# Patient Record
Sex: FEMALE | URBAN - METROPOLITAN AREA
[De-identification: names, ages, dates, MRNs, and addresses within clinical notes are randomized per-mention and may not be internally consistent; named-entity substitution may affect disease eponyms.]

---

## 2018-04-04 ENCOUNTER — APPOINTMENT (RX ONLY)
Dept: URBAN - METROPOLITAN AREA CLINIC 116 | Facility: CLINIC | Age: 68
Setting detail: DERMATOLOGY
End: 2018-04-04

## 2018-04-04 DIAGNOSIS — L81.4 OTHER MELANIN HYPERPIGMENTATION: ICD-10-CM

## 2018-04-04 DIAGNOSIS — Z71.89 OTHER SPECIFIED COUNSELING: ICD-10-CM

## 2018-04-04 DIAGNOSIS — L81.9 DISORDER OF PIGMENTATION, UNSPECIFIED: ICD-10-CM

## 2018-04-04 PROCEDURE — 99202 OFFICE O/P NEW SF 15 MIN: CPT

## 2018-04-04 PROCEDURE — ? TREATMENT REGIMEN

## 2018-04-04 PROCEDURE — ? COUNSELING

## 2018-04-04 ASSESSMENT — LOCATION DETAILED DESCRIPTION DERM
LOCATION DETAILED: RIGHT MEDIAL FOREHEAD
LOCATION DETAILED: LEFT MID PREAURICULAR CHEEK
LOCATION DETAILED: RIGHT CENTRAL MALAR CHEEK

## 2018-04-04 ASSESSMENT — LOCATION ZONE DERM: LOCATION ZONE: FACE

## 2018-04-04 ASSESSMENT — LOCATION SIMPLE DESCRIPTION DERM
LOCATION SIMPLE: LEFT CHEEK
LOCATION SIMPLE: RIGHT CHEEK
LOCATION SIMPLE: RIGHT FOREHEAD

## 2018-04-04 NOTE — PROCEDURE: TREATMENT REGIMEN
Initiate Treatment: Free and clear liquid cleanser daily, hat, sunscreen
Plan: Discussed in detail 45 mins) possible causes/differential diagnosis considerations of hyperpigmentation/gray skin tone on face i.e. methemoglobulinemia, lead, silver, copper exposure/ingestion, irritation from skin care products and hues (?[employer] product) , dyschromia from sun exposure, medications/photosensitivity reaction with BP medications, soy, hydroquinone, post inflammatory hyperpigmentation, COPD, heamchromatosis, other. Recommended 110 SPF or sun block. Elta MD block \\nAdvised to get bloodwork done with PCP: estrogen,  copper, lead, FSH/LH, CBC with diff,CMP, Iron,anti DS DNA, DILLON, homogentisic acid, see cardiologist to check oxygen levels. \\nBiopsy for diagnosis was offered and pt declined at this time. \\nRecommended dermablend makeup for coverage and total sun block.
Samples Given: Neutrogena 60 spf sunscreen pure baby
Detail Level: Zone
Discontinue Regimen: Current facial products

## 2018-04-04 NOTE — PROCEDURE: MIPS QUALITY
Detail Level: Detailed
Quality 130: Documentation Of Current Medications In The Medical Record: Current Medications Documented
Quality 226: Preventive Care And Screening: Tobacco Use: Screening And Cessation Intervention: Patient screened for tobacco and never smoked
Quality 111:Pneumonia Vaccination Status For Older Adults: Pneumococcal Vaccination not Administered or Previously Received, Reason not Otherwise Specified
Quality 110: Preventive Care And Screening: Influenza Immunization: Influenza Immunization not Administered for Documented Reasons.

## 2018-04-04 NOTE — HPI: DISCOLORATION
How Severe Is Your Skin Discoloration?: mild
Additional History: Pt went to see her pcp monday and she stated her pcp stated the Losartan 25 mg  is what could be cause  gray skin tone and to stop taking immediately, and to follow up with dermatologist.  Pt regimen was Olay moisturizer, a aveeno brightener facial wash, Eucerin sunscreen. Pt was using [employer] for makeup was given two products.

## 2018-05-04 ENCOUNTER — APPOINTMENT (RX ONLY)
Dept: URBAN - METROPOLITAN AREA CLINIC 116 | Facility: CLINIC | Age: 68
Setting detail: DERMATOLOGY
End: 2018-05-04

## 2018-05-04 DIAGNOSIS — L81.9 DISORDER OF PIGMENTATION, UNSPECIFIED: ICD-10-CM

## 2018-05-04 DIAGNOSIS — I78.8 OTHER DISEASES OF CAPILLARIES: ICD-10-CM

## 2018-05-04 PROCEDURE — 99213 OFFICE O/P EST LOW 20 MIN: CPT

## 2018-05-04 PROCEDURE — ? COUNSELING

## 2018-05-04 PROCEDURE — ? OTHER

## 2018-05-04 PROCEDURE — ? TREATMENT REGIMEN

## 2018-05-04 PROCEDURE — ? REFERRAL

## 2018-05-04 ASSESSMENT — LOCATION DETAILED DESCRIPTION DERM
LOCATION DETAILED: RIGHT NASAL ALA
LOCATION DETAILED: NASAL SUPRATIP
LOCATION DETAILED: LEFT CENTRAL MALAR CHEEK
LOCATION DETAILED: RIGHT CENTRAL MALAR CHEEK

## 2018-05-04 ASSESSMENT — LOCATION SIMPLE DESCRIPTION DERM
LOCATION SIMPLE: NOSE
LOCATION SIMPLE: LEFT CHEEK
LOCATION SIMPLE: RIGHT NOSE
LOCATION SIMPLE: RIGHT CHEEK

## 2018-05-04 ASSESSMENT — LOCATION ZONE DERM
LOCATION ZONE: FACE
LOCATION ZONE: NOSE

## 2018-05-04 NOTE — PROCEDURE: OTHER
Other (Free Text): DDX: Photocontact vs contact dermatitis vs drug reaction vs melasma/hormonal, thyroid disorder, photodermatitis
Note Text (......Xxx Chief Complaint.): This diagnosis correlates with the
Detail Level: Zone

## 2018-05-04 NOTE — PROCEDURE: TREATMENT REGIMEN
Detail Level: Zone
Plan: Had a lengthy discussion and discussed with patient using hydroquinone to lighten hyperpigmented/gray areas on face or Tretinoin and dermal repair cream.\\Guido discussed a biopsy would determine type of pigmentation and depth to aid in diagnosis and treatment options. \\nDiscussed laser or facial peel at [facility] and biopsy to find out how deep pigmentation changes are located in the dermis. \\nReferred to Dr. Fede Ibarra (plastic surgeon) for second opinion, laser and medium peels for dyschromia. \\nPatient was reminded to return to her internist to exclude internal causes of hyperpigmentation.
Continue Regimen: Sunscreen, hats while outside and dermablend full coverage makeup to cover AA and giving additional sun protection.
Discontinue Regimen: OTC products she was using.

## 2022-07-09 ENCOUNTER — TELEPHONE ENCOUNTER (OUTPATIENT)
Dept: URBAN - METROPOLITAN AREA CLINIC 121 | Facility: CLINIC | Age: 72
End: 2022-07-09

## 2022-07-09 RX ORDER — CALCIUM CARBONATE/VITAMIN D3 600 MG-10
TABLET ORAL ONCE A DAY
Refills: 0 | OUTPATIENT
Start: 2015-09-30 | End: 2016-11-18

## 2022-07-09 RX ORDER — CHLORHEXIDINE GLUCONATE 4 %
LIQUID (ML) TOPICAL
Refills: 0 | OUTPATIENT
Start: 2015-09-30 | End: 2015-09-30

## 2022-07-09 RX ORDER — BUTALBITAL, ACETAMINOPHEN, CAFFEINE, AND CODEINE PHOSPHATE 50; 300; 40; 30 MG/1; MG/1; MG/1; MG/1
CAPSULE ORAL AS NEEDED
Refills: 0 | OUTPATIENT
Start: 2016-11-18 | End: 2016-11-18

## 2022-07-09 RX ORDER — BUTALBITAL, ACETAMINOPHEN, CAFFEINE, AND CODEINE PHOSPHATE 50; 300; 40; 30 MG/1; MG/1; MG/1; MG/1
CAPSULE ORAL
Refills: 0 | OUTPATIENT
Start: 2015-08-18 | End: 2016-11-18

## 2022-07-09 RX ORDER — CHLORHEXIDINE GLUCONATE 4 %
LIQUID (ML) TOPICAL
Refills: 0 | OUTPATIENT
Start: 2014-03-28 | End: 2015-09-30

## 2022-07-10 ENCOUNTER — TELEPHONE ENCOUNTER (OUTPATIENT)
Dept: URBAN - METROPOLITAN AREA CLINIC 121 | Facility: CLINIC | Age: 72
End: 2022-07-10

## 2022-07-10 RX ORDER — CALCIUM CARBONATE/VITAMIN D3 600 MG-10
TABLET ORAL ONCE A DAY
Refills: 0 | Status: ACTIVE | COMMUNITY
Start: 2016-11-18

## 2022-07-10 RX ORDER — SUCRALFATE 1 G/10ML
2 TEASPOONS QID SUSPENSION ORAL
Refills: 1 | Status: ACTIVE | COMMUNITY
Start: 2015-01-06

## 2022-07-10 RX ORDER — UBIDECARENONE 200 MG
CAPSULE ORAL ONCE A DAY
Refills: 0 | Status: ACTIVE | COMMUNITY
Start: 2016-11-18

## 2022-07-10 RX ORDER — MULTIVITAMIN
A D B5 PLUS BIOTICS TABLET ORAL ONCE A DAY
Refills: 0 | Status: ACTIVE | COMMUNITY
Start: 2016-11-18

## 2022-07-10 RX ORDER — SUCRALFATE 1 G/10ML
2 TEASPOONS QID SUSPENSION ORAL TAKE AS DIRECTED
Refills: 1 | Status: ACTIVE | COMMUNITY
Start: 2015-01-07

## 2022-07-10 RX ORDER — OMEPRAZOLE 40 MG/1
TAKE ONE 30MIN BEFORE BF AND ONE 30MIN BEFORE DINNER CAPSULE, DELAYED RELEASE ORAL TWICE A DAY
Refills: 1 | Status: ACTIVE | COMMUNITY
Start: 2014-12-29

## 2022-07-10 RX ORDER — DM/PE/ACETAMINOPHEN/CHLORPHENR 10-5-325-2
TABLET, SEQUENTIAL ORAL ONCE A DAY
Refills: 0 | Status: ACTIVE | COMMUNITY
Start: 2016-11-18

## 2024-09-19 ENCOUNTER — TELEPHONE ENCOUNTER (OUTPATIENT)
Dept: URBAN - METROPOLITAN AREA CLINIC 57 | Facility: CLINIC | Age: 74
End: 2024-09-19

## 2024-10-11 ENCOUNTER — OFFICE VISIT (OUTPATIENT)
Dept: URBAN - METROPOLITAN AREA CLINIC 57 | Facility: CLINIC | Age: 74
End: 2024-10-11

## 2024-11-06 ENCOUNTER — DASHBOARD ENCOUNTERS (OUTPATIENT)
Age: 74
End: 2024-11-06

## 2024-11-06 ENCOUNTER — LAB OUTSIDE AN ENCOUNTER (OUTPATIENT)
Dept: URBAN - METROPOLITAN AREA CLINIC 57 | Facility: CLINIC | Age: 74
End: 2024-11-06

## 2024-11-06 ENCOUNTER — OFFICE VISIT (OUTPATIENT)
Dept: URBAN - METROPOLITAN AREA CLINIC 57 | Facility: CLINIC | Age: 74
End: 2024-11-06
Payer: MEDICARE

## 2024-11-06 VITALS
WEIGHT: 132 LBS | BODY MASS INDEX: 22.53 KG/M2 | HEIGHT: 64 IN | SYSTOLIC BLOOD PRESSURE: 110 MMHG | DIASTOLIC BLOOD PRESSURE: 65 MMHG | HEART RATE: 65 BPM | OXYGEN SATURATION: 94 %

## 2024-11-06 DIAGNOSIS — Z12.11 COLON CANCER SCREENING: ICD-10-CM

## 2024-11-06 DIAGNOSIS — R19.5 POSITIVE COLORECTAL CANCER SCREENING USING COLOGUARD TEST: ICD-10-CM

## 2024-11-06 PROCEDURE — 99203 OFFICE O/P NEW LOW 30 MIN: CPT | Performed by: INTERNAL MEDICINE

## 2024-11-06 RX ORDER — AMLODIPINE BESYLATE 5 MG/1
TABLET ORAL
Qty: 90 TABLET | Status: ACTIVE | COMMUNITY

## 2024-11-06 RX ORDER — ALENDRONATE SODIUM 70 MG/1
PLEASE TAKE ONE TABLET WEEKLY ON AN EMPTY STOMACH AND REMAIN UPRIGHT FOR 45 MINUTES TABLET ORAL
Qty: 12 EACH | Refills: 0 | Status: ACTIVE | COMMUNITY

## 2024-11-06 RX ORDER — TRAMADOL HYDROCHLORIDE 50 MG/1
TAKE 1 TABLET UP TO THREE TIMES DAILY AS NEEDED FOR ACUTE PAIN TABLET, COATED ORAL
Qty: 3 EACH | Refills: 0 | Status: ACTIVE | COMMUNITY

## 2024-11-06 NOTE — HPI-TODAY'S VISIT:
presenting today for colon consult.  Last colonoscopy: Recall given: Patient denies overuse of NSAIDs, constipation, diarrhea, hematochezia, melena, N/V, kidney problems, anorexia or weight loss.  No family history of colon cancer or polyps. BMI:Okay to be done in surgery center: Patient referred here for evaluation positive Cologuard test. Last colonoscopy was 10 years ago, single hyperplastic polyp noted. She denies any new GI complaints. No melena or rectal bleeding reported. Denies any unusual weight loss.

## 2025-01-06 ENCOUNTER — OFFICE VISIT (OUTPATIENT)
Dept: URBAN - METROPOLITAN AREA SURGERY CENTER 4 | Facility: SURGERY CENTER | Age: 75
End: 2025-01-06

## 2025-01-24 ENCOUNTER — OFFICE VISIT (OUTPATIENT)
Dept: URBAN - METROPOLITAN AREA CLINIC 57 | Facility: CLINIC | Age: 75
End: 2025-01-24

## 2025-03-25 ENCOUNTER — OFFICE VISIT (OUTPATIENT)
Dept: URBAN - METROPOLITAN AREA SURGERY CENTER 4 | Facility: SURGERY CENTER | Age: 75
End: 2025-03-25

## 2025-04-10 ENCOUNTER — CLAIMS CREATED FROM THE CLAIM WINDOW (OUTPATIENT)
Dept: URBAN - METROPOLITAN AREA SURGERY CENTER 4 | Facility: SURGERY CENTER | Age: 75
End: 2025-04-10
Payer: MEDICARE

## 2025-04-10 ENCOUNTER — CLAIMS CREATED FROM THE CLAIM WINDOW (OUTPATIENT)
Dept: URBAN - METROPOLITAN AREA CLINIC 4 | Facility: CLINIC | Age: 75
End: 2025-04-10
Payer: MEDICARE

## 2025-04-10 DIAGNOSIS — R19.5 OTHER FECAL ABNORMALITIES: ICD-10-CM

## 2025-04-10 DIAGNOSIS — D12.2 BENIGN NEOPLASM OF ASCENDING COLON: ICD-10-CM

## 2025-04-10 DIAGNOSIS — D12.3 BENIGN NEOPLASM OF TRANSVERSE COLON: ICD-10-CM

## 2025-04-10 DIAGNOSIS — Z12.11 ENCOUNTER FOR SCREENING FOR MALIGNANT NEOPLASM OF COLON: ICD-10-CM

## 2025-04-10 DIAGNOSIS — K57.30 DIVERTICULOSIS OF LARGE INTESTINE WITHOUT PERFORATION OR ABSCESS WITHOUT BLEEDING: ICD-10-CM

## 2025-04-10 DIAGNOSIS — K64.0 FIRST DEGREE HEMORRHOIDS: ICD-10-CM

## 2025-04-10 DIAGNOSIS — R19.5 POSITIVE COLORECTAL CANCER SCREENING USING COLOGUARD TEST: ICD-10-CM

## 2025-04-10 DIAGNOSIS — K63.5 COLON POLYP: ICD-10-CM

## 2025-04-10 DIAGNOSIS — Z12.11 COLON CANCER SCREENING: ICD-10-CM

## 2025-04-10 PROCEDURE — 45380 COLONOSCOPY AND BIOPSY: CPT | Performed by: INTERNAL MEDICINE

## 2025-04-10 PROCEDURE — 45385 COLONOSCOPY W/LESION REMOVAL: CPT | Performed by: INTERNAL MEDICINE

## 2025-04-10 PROCEDURE — 88305 TISSUE EXAM BY PATHOLOGIST: CPT | Performed by: PATHOLOGY

## 2025-04-10 PROCEDURE — 00811 ANES LWR INTST NDSC NOS: CPT | Performed by: NURSE ANESTHETIST, CERTIFIED REGISTERED

## 2025-04-10 RX ORDER — AMLODIPINE BESYLATE 5 MG/1
TABLET ORAL
Qty: 90 TABLET | Status: ACTIVE | COMMUNITY

## 2025-04-10 RX ORDER — ALENDRONATE SODIUM 70 MG/1
PLEASE TAKE ONE TABLET WEEKLY ON AN EMPTY STOMACH AND REMAIN UPRIGHT FOR 45 MINUTES TABLET ORAL
Qty: 12 EACH | Refills: 0 | Status: ACTIVE | COMMUNITY

## 2025-04-10 RX ORDER — TRAMADOL HYDROCHLORIDE 50 MG/1
TAKE 1 TABLET UP TO THREE TIMES DAILY AS NEEDED FOR ACUTE PAIN TABLET, COATED ORAL
Qty: 3 EACH | Refills: 0 | Status: ACTIVE | COMMUNITY

## 2025-04-11 ENCOUNTER — TELEPHONE ENCOUNTER (OUTPATIENT)
Dept: URBAN - METROPOLITAN AREA CLINIC 57 | Facility: CLINIC | Age: 75
End: 2025-04-11

## 2025-04-17 ENCOUNTER — TELEPHONE ENCOUNTER (OUTPATIENT)
Dept: URBAN - METROPOLITAN AREA CLINIC 57 | Facility: CLINIC | Age: 75
End: 2025-04-17

## 2025-04-23 ENCOUNTER — TELEPHONE ENCOUNTER (OUTPATIENT)
Dept: URBAN - METROPOLITAN AREA CLINIC 57 | Facility: CLINIC | Age: 75
End: 2025-04-23